# Patient Record
Sex: FEMALE | Race: OTHER | URBAN - METROPOLITAN AREA
[De-identification: names, ages, dates, MRNs, and addresses within clinical notes are randomized per-mention and may not be internally consistent; named-entity substitution may affect disease eponyms.]

---

## 2023-04-20 ENCOUNTER — OFFICE VISIT (OUTPATIENT)
Dept: DERMATOLOGY | Age: 33
End: 2023-04-20
Payer: COMMERCIAL

## 2023-04-20 DIAGNOSIS — L81.4 LENTIGINES: ICD-10-CM

## 2023-04-20 DIAGNOSIS — D22.9 MULTIPLE NEVI: Primary | ICD-10-CM

## 2023-04-20 PROCEDURE — 99203 OFFICE O/P NEW LOW 30 MIN: CPT | Performed by: DERMATOLOGY

## 2023-04-20 RX ORDER — HYDROXYCHLOROQUINE SULFATE 200 MG/1
200 TABLET, FILM COATED ORAL 2 TIMES DAILY
COMMUNITY

## 2023-04-20 RX ORDER — M-VIT,TX,IRON,MINS/CALC/FOLIC 27MG-0.4MG
1 TABLET ORAL DAILY
COMMUNITY

## 2023-04-20 NOTE — PROGRESS NOTES
of [facility]  Continue sun protection - OTC sunscreen with SPF 30-50+ recommended and reviewed usage  Encouraged skin check yearly (sooner if indicated), self checks    F/u ~1 year.

## 2025-04-28 ENCOUNTER — OFFICE VISIT (OUTPATIENT)
Age: 35
End: 2025-04-28
Payer: COMMERCIAL

## 2025-04-28 DIAGNOSIS — D22.9 MULTIPLE NEVI: Primary | ICD-10-CM

## 2025-04-28 DIAGNOSIS — L72.0 EPIDERMOID CYST: ICD-10-CM

## 2025-04-28 DIAGNOSIS — L81.4 LENTIGINES: ICD-10-CM

## 2025-04-28 PROCEDURE — 99213 OFFICE O/P EST LOW 20 MIN: CPT | Performed by: DERMATOLOGY

## 2025-04-28 NOTE — PROGRESS NOTES
[facility]  Soheila Richmond MD  [phone]      Alena Buchanan  1990    35 y.o. female     Date of Visit: 4/28/2025    Last seen: 4-2023  *maiden name Antonio - charts marked for merge    Referred by: OLIVE Linares Tamara Dewayne and mom was a patient at  of Dr. Joo Alvarez    Chief Complaint:   Chief Complaint   Patient presents with    Skin Exam     History of Present Illness:    Here for evaluation of multiple asx pigmented lesions on the trunk and extremities, present for many years; no change in size/shape/color of any lesions; no bleeding lesions.     Hx of dysplastic nevus removed from the L arm (done prior to 2013).  No probs since.    She notes a bump on the right upper back that has been present for many years.  Might be slightly bigger but is asymptomatic.  No drainage.    *first baby born 2-2023    No personal hx of skin cancer.  No tanning bed use in the past.  Few mild sunburns as a child.    + family hx of skin cancer - mom with AK's  SCC - aunt (mat and pat), mat grandmother  [facility] - maternal grandfather    Works for [employer].    Review of Systems:  Gen: Feels well, good sense of health.  Skin: No changing moles or lesions.     Past Medical History, Family History, Surgical History, Medications and Allergies reviewed.    Outpatient Medications Marked as Taking for the 4/28/25 encounter (Office Visit) with Soheila Richmond MD   Medication Sig Dispense Refill    hydroxychloroquine (PLAQUENIL) 200 MG tablet Take 1 tablet by mouth 2 times daily      Multiple Vitamins-Minerals (THERAPEUTIC MULTIVITAMIN-MINERALS) tablet Take 1 tablet by mouth daily       Allergies   Allergen Reactions    Gluten Diarrhea     Celiac Disease-       History reviewed. No pertinent past medical history.  History reviewed. No pertinent surgical history.    Physical Examination     Gen, well-appearing  FSE today except for underwear-covered areas    trunk and extremities with